# Patient Record
Sex: FEMALE | Race: WHITE | Employment: FULL TIME | URBAN - METROPOLITAN AREA
[De-identification: names, ages, dates, MRNs, and addresses within clinical notes are randomized per-mention and may not be internally consistent; named-entity substitution may affect disease eponyms.]

---

## 2017-07-07 ENCOUNTER — APPOINTMENT (OUTPATIENT)
Dept: OCCUPATIONAL MEDICINE | Age: 62
End: 2017-07-07
Attending: FAMILY MEDICINE

## 2020-07-06 ENCOUNTER — NURSE NAVIGATOR ENCOUNTER (OUTPATIENT)
Dept: HEMATOLOGY/ONCOLOGY | Facility: HOSPITAL | Age: 65
End: 2020-07-06

## 2020-07-10 ENCOUNTER — OFFICE VISIT (OUTPATIENT)
Dept: RADIATION ONCOLOGY | Facility: HOSPITAL | Age: 65
End: 2020-07-10
Attending: RADIOLOGY
Payer: COMMERCIAL

## 2020-07-10 VITALS
OXYGEN SATURATION: 98 % | WEIGHT: 152.38 LBS | TEMPERATURE: 98 F | SYSTOLIC BLOOD PRESSURE: 153 MMHG | DIASTOLIC BLOOD PRESSURE: 73 MMHG | HEART RATE: 75 BPM | RESPIRATION RATE: 16 BRPM

## 2020-07-10 DIAGNOSIS — C50.312 CARCINOMA OF LOWER-INNER QUADRANT OF LEFT BREAST IN FEMALE, ESTROGEN RECEPTOR POSITIVE (HCC): Primary | ICD-10-CM

## 2020-07-10 DIAGNOSIS — Z17.0 CARCINOMA OF LOWER-INNER QUADRANT OF LEFT BREAST IN FEMALE, ESTROGEN RECEPTOR POSITIVE (HCC): Primary | ICD-10-CM

## 2020-07-10 PROBLEM — C50.919 MALIGNANT NEOPLASM OF FEMALE BREAST (HCC): Status: ACTIVE | Noted: 2019-05-01

## 2020-07-10 PROCEDURE — 99202 OFFICE O/P NEW SF 15 MIN: CPT

## 2020-07-10 RX ORDER — HYDROCHLOROTHIAZIDE 12.5 MG/1
CAPSULE, GELATIN COATED ORAL
COMMUNITY
Start: 2014-06-26

## 2020-07-10 RX ORDER — ANASTROZOLE 1 MG/1
TABLET ORAL
COMMUNITY
Start: 2020-07-09 | End: 2020-07-10

## 2020-07-10 RX ORDER — ANASTROZOLE 1 MG/1
1 TABLET ORAL DAILY
COMMUNITY
End: 2022-01-03

## 2020-07-10 NOTE — CONSULTS
[facility] RADIATION ONCOLOGY CONSULTATION     PATIENT:   Liana June MD:  Carline Babb MD      DIAGNOSIS:   T1c N0 M0 ER positive ID positive HER-2/romario negative left lower inner breast IDC       CC:    Left breast cancer only 2, corresponding to a 3% risk of DM with endocrine therapy alone. She is now on anastrozole. No family history of breast or ovarian cancer but a positive history of colon cancer.   I believe her genetic counseling is being handled at [facility].    PMYVROSE/PS take 4 weeks  -We will need new baseline bilateral mammography in February 2021  -Endocrine therapy under care of Dr. Aashish Real MD  Radiation Oncology    CC: MD Andi Irizarry MD

## 2020-07-10 NOTE — PATIENT INSTRUCTIONS
CT Simulation will be 7/17/2020 at 1000am.  For any questions call [facility] or Ofe Minaya RN at [phone].

## 2020-07-10 NOTE — PROGRESS NOTES
Nursing Consultation Note  Patient: Everette Kelley  YOB: 1955  Age: 60year old  Radiation Oncologist: Dr. Tanja Donohue  Referring Physician: Erasmo Tesfaye  Diagnosis:No diagnosis found.   Consult Date: 7/10/2020      Chemotherapy: N on file      Food insecurity:        Worry: Not on file        Inability: Not on file      Transportation needs:        Medical: Not on file        Non-medical: Not on file    Tobacco Use      Smoking status: Never Smoker    Substance and Sexual Activity dry    Expected Outcomes:  Intact skin Free from infection Knowledge of disease process    Progress Toward Outcome:  Making progress    Pamphlets/Handouts Given to Patient:  Basic skin care.         Knowledge Deficit Plan Of Care:    Problem:  Knowledge Def

## 2020-07-17 ENCOUNTER — APPOINTMENT (OUTPATIENT)
Dept: RADIATION ONCOLOGY | Facility: HOSPITAL | Age: 65
End: 2020-07-17
Attending: RADIOLOGY
Payer: COMMERCIAL

## 2020-07-17 PROCEDURE — 77290 THER RAD SIMULAJ FIELD CPLX: CPT | Performed by: RADIOLOGY

## 2020-07-17 PROCEDURE — 77333 RADIATION TREATMENT AID(S): CPT | Performed by: RADIOLOGY

## 2020-07-22 PROCEDURE — 77334 RADIATION TREATMENT AID(S): CPT | Performed by: RADIOLOGY

## 2020-07-22 PROCEDURE — 77295 3-D RADIOTHERAPY PLAN: CPT | Performed by: RADIOLOGY

## 2020-07-22 PROCEDURE — 77300 RADIATION THERAPY DOSE PLAN: CPT | Performed by: RADIOLOGY

## 2020-07-23 PROCEDURE — 77290 THER RAD SIMULAJ FIELD CPLX: CPT | Performed by: RADIOLOGY

## 2020-07-23 PROCEDURE — 77300 RADIATION THERAPY DOSE PLAN: CPT | Performed by: RADIOLOGY

## 2020-07-23 PROCEDURE — 77295 3-D RADIOTHERAPY PLAN: CPT | Performed by: RADIOLOGY

## 2020-07-23 PROCEDURE — 77334 RADIATION TREATMENT AID(S): CPT | Performed by: RADIOLOGY

## 2020-07-27 ENCOUNTER — LAB ENCOUNTER (OUTPATIENT)
Dept: LAB | Facility: HOSPITAL | Age: 65
End: 2020-07-27
Attending: RADIOLOGY
Payer: COMMERCIAL

## 2020-07-27 DIAGNOSIS — C50.312 CARCINOMA OF LOWER-INNER QUADRANT OF LEFT BREAST IN FEMALE, ESTROGEN RECEPTOR POSITIVE (HCC): ICD-10-CM

## 2020-07-27 DIAGNOSIS — Z17.0 CARCINOMA OF LOWER-INNER QUADRANT OF LEFT BREAST IN FEMALE, ESTROGEN RECEPTOR POSITIVE (HCC): ICD-10-CM

## 2020-07-28 LAB — SARS-COV-2 RNA RESP QL NAA+PROBE: NOT DETECTED

## 2020-08-01 ENCOUNTER — NURSE ONLY (OUTPATIENT)
Dept: RADIATION ONCOLOGY | Facility: HOSPITAL | Age: 65
End: 2020-08-01
Attending: RADIOLOGY
Payer: COMMERCIAL

## 2020-08-06 ENCOUNTER — APPOINTMENT (OUTPATIENT)
Dept: RADIATION ONCOLOGY | Facility: HOSPITAL | Age: 65
End: 2020-08-06
Attending: RADIOLOGY
Payer: COMMERCIAL

## 2020-08-06 PROCEDURE — 77280 THER RAD SIMULAJ FIELD SMPL: CPT | Performed by: RADIOLOGY

## 2020-08-06 PROCEDURE — 77412 RADIATION TX DELIVERY LVL 3: CPT | Performed by: RADIOLOGY

## 2020-08-07 PROCEDURE — 77412 RADIATION TX DELIVERY LVL 3: CPT | Performed by: RADIOLOGY

## 2020-08-07 PROCEDURE — 77290 THER RAD SIMULAJ FIELD CPLX: CPT | Performed by: RADIOLOGY

## 2020-08-10 PROCEDURE — 77387 GUIDANCE FOR RADJ TX DLVR: CPT | Performed by: RADIOLOGY

## 2020-08-10 PROCEDURE — 77412 RADIATION TX DELIVERY LVL 3: CPT | Performed by: RADIOLOGY

## 2020-08-11 ENCOUNTER — OFFICE VISIT (OUTPATIENT)
Dept: RADIATION ONCOLOGY | Facility: HOSPITAL | Age: 65
End: 2020-08-11
Attending: RADIOLOGY
Payer: COMMERCIAL

## 2020-08-11 VITALS
OXYGEN SATURATION: 99 % | DIASTOLIC BLOOD PRESSURE: 79 MMHG | SYSTOLIC BLOOD PRESSURE: 135 MMHG | HEART RATE: 61 BPM | RESPIRATION RATE: 16 BRPM | TEMPERATURE: 98 F

## 2020-08-11 DIAGNOSIS — C50.312 CARCINOMA OF LOWER-INNER QUADRANT OF LEFT BREAST IN FEMALE, ESTROGEN RECEPTOR POSITIVE (HCC): Primary | ICD-10-CM

## 2020-08-11 DIAGNOSIS — Z17.0 CARCINOMA OF LOWER-INNER QUADRANT OF LEFT BREAST IN FEMALE, ESTROGEN RECEPTOR POSITIVE (HCC): Primary | ICD-10-CM

## 2020-08-11 PROCEDURE — 77412 RADIATION TX DELIVERY LVL 3: CPT | Performed by: RADIOLOGY

## 2020-08-11 PROCEDURE — 77387 GUIDANCE FOR RADJ TX DLVR: CPT | Performed by: RADIOLOGY

## 2020-08-11 NOTE — PROGRESS NOTES
[facility] Radiation Treatment Management Note 1-5    Patient:  Elizabeth Koch  Age:  60year old  Visit Diagnosis:    1.  Carcinoma of lower-inner quadrant of left breast in female, estrogen receptor positive ([facility] [address]      Primary

## 2020-08-12 PROCEDURE — 77412 RADIATION TX DELIVERY LVL 3: CPT | Performed by: RADIOLOGY

## 2020-08-12 PROCEDURE — 77387 GUIDANCE FOR RADJ TX DLVR: CPT | Performed by: RADIOLOGY

## 2020-08-13 PROCEDURE — 77387 GUIDANCE FOR RADJ TX DLVR: CPT | Performed by: RADIOLOGY

## 2020-08-13 PROCEDURE — 77412 RADIATION TX DELIVERY LVL 3: CPT | Performed by: RADIOLOGY

## 2020-08-14 PROCEDURE — 77387 GUIDANCE FOR RADJ TX DLVR: CPT | Performed by: RADIOLOGY

## 2020-08-14 PROCEDURE — 77412 RADIATION TX DELIVERY LVL 3: CPT | Performed by: RADIOLOGY

## 2020-08-14 PROCEDURE — 77336 RADIATION PHYSICS CONSULT: CPT | Performed by: RADIOLOGY

## 2020-08-17 PROCEDURE — 77387 GUIDANCE FOR RADJ TX DLVR: CPT | Performed by: RADIOLOGY

## 2020-08-17 PROCEDURE — 77412 RADIATION TX DELIVERY LVL 3: CPT | Performed by: RADIOLOGY

## 2020-08-18 ENCOUNTER — APPOINTMENT (OUTPATIENT)
Dept: RADIATION ONCOLOGY | Facility: HOSPITAL | Age: 65
End: 2020-08-18
Attending: RADIOLOGY
Payer: COMMERCIAL

## 2020-08-18 PROCEDURE — 77387 GUIDANCE FOR RADJ TX DLVR: CPT | Performed by: RADIOLOGY

## 2020-08-18 PROCEDURE — 77412 RADIATION TX DELIVERY LVL 3: CPT | Performed by: RADIOLOGY

## 2020-08-19 PROCEDURE — 77412 RADIATION TX DELIVERY LVL 3: CPT | Performed by: RADIOLOGY

## 2020-08-19 PROCEDURE — 77387 GUIDANCE FOR RADJ TX DLVR: CPT | Performed by: RADIOLOGY

## 2020-08-20 ENCOUNTER — OFFICE VISIT (OUTPATIENT)
Dept: RADIATION ONCOLOGY | Facility: HOSPITAL | Age: 65
End: 2020-08-20
Attending: RADIOLOGY
Payer: COMMERCIAL

## 2020-08-20 VITALS
TEMPERATURE: 99 F | RESPIRATION RATE: 16 BRPM | HEART RATE: 70 BPM | DIASTOLIC BLOOD PRESSURE: 73 MMHG | SYSTOLIC BLOOD PRESSURE: 143 MMHG

## 2020-08-20 DIAGNOSIS — Z17.0 CARCINOMA OF LOWER-INNER QUADRANT OF LEFT BREAST IN FEMALE, ESTROGEN RECEPTOR POSITIVE (HCC): Primary | ICD-10-CM

## 2020-08-20 DIAGNOSIS — C50.312 CARCINOMA OF LOWER-INNER QUADRANT OF LEFT BREAST IN FEMALE, ESTROGEN RECEPTOR POSITIVE (HCC): Primary | ICD-10-CM

## 2020-08-20 PROCEDURE — 77412 RADIATION TX DELIVERY LVL 3: CPT | Performed by: RADIOLOGY

## 2020-08-20 PROCEDURE — 77387 GUIDANCE FOR RADJ TX DLVR: CPT | Performed by: RADIOLOGY

## 2020-08-20 NOTE — PROGRESS NOTES
[facility] Radiation Treatment Management Note 11-15    Patient:  Lorenzo Rao  Age:  60year old  Visit Diagnosis:  L breast IDC, pT1c N0 cM0, stage IA, grade 1, ER/AR+  Primary Rad/Onc:  Dr. Rere Thomas

## 2020-08-21 ENCOUNTER — APPOINTMENT (OUTPATIENT)
Dept: RADIATION ONCOLOGY | Facility: HOSPITAL | Age: 65
End: 2020-08-21
Attending: RADIOLOGY
Payer: COMMERCIAL

## 2020-08-21 PROCEDURE — 77290 THER RAD SIMULAJ FIELD CPLX: CPT | Performed by: RADIOLOGY

## 2020-08-21 PROCEDURE — 77412 RADIATION TX DELIVERY LVL 3: CPT | Performed by: RADIOLOGY

## 2020-08-21 PROCEDURE — 77336 RADIATION PHYSICS CONSULT: CPT | Performed by: RADIOLOGY

## 2020-08-21 PROCEDURE — 77285 THER RAD SIMULAJ FIELD INTRM: CPT | Performed by: RADIOLOGY

## 2020-08-24 PROCEDURE — 77412 RADIATION TX DELIVERY LVL 3: CPT | Performed by: RADIOLOGY

## 2020-08-24 PROCEDURE — 77387 GUIDANCE FOR RADJ TX DLVR: CPT | Performed by: RADIOLOGY

## 2020-08-25 PROCEDURE — 77321 SPECIAL TELETX PORT PLAN: CPT | Performed by: RADIOLOGY

## 2020-08-25 PROCEDURE — 77290 THER RAD SIMULAJ FIELD CPLX: CPT | Performed by: RADIOLOGY

## 2020-08-25 PROCEDURE — 77334 RADIATION TREATMENT AID(S): CPT | Performed by: RADIOLOGY

## 2020-08-25 PROCEDURE — 77412 RADIATION TX DELIVERY LVL 3: CPT | Performed by: RADIOLOGY

## 2020-08-26 PROCEDURE — 77387 GUIDANCE FOR RADJ TX DLVR: CPT | Performed by: RADIOLOGY

## 2020-08-26 PROCEDURE — 77412 RADIATION TX DELIVERY LVL 3: CPT | Performed by: RADIOLOGY

## 2020-08-27 ENCOUNTER — APPOINTMENT (OUTPATIENT)
Dept: RADIATION ONCOLOGY | Facility: HOSPITAL | Age: 65
End: 2020-08-27
Attending: RADIOLOGY
Payer: COMMERCIAL

## 2020-08-27 VITALS
DIASTOLIC BLOOD PRESSURE: 83 MMHG | TEMPERATURE: 99 F | SYSTOLIC BLOOD PRESSURE: 141 MMHG | HEART RATE: 62 BPM | OXYGEN SATURATION: 100 % | RESPIRATION RATE: 16 BRPM

## 2020-08-27 DIAGNOSIS — Z17.0 CARCINOMA OF LOWER-INNER QUADRANT OF LEFT BREAST IN FEMALE, ESTROGEN RECEPTOR POSITIVE (HCC): Primary | ICD-10-CM

## 2020-08-27 DIAGNOSIS — C50.312 CARCINOMA OF LOWER-INNER QUADRANT OF LEFT BREAST IN FEMALE, ESTROGEN RECEPTOR POSITIVE (HCC): Primary | ICD-10-CM

## 2020-08-27 PROCEDURE — 77412 RADIATION TX DELIVERY LVL 3: CPT | Performed by: RADIOLOGY

## 2020-08-27 PROCEDURE — 77280 THER RAD SIMULAJ FIELD SMPL: CPT | Performed by: RADIOLOGY

## 2020-08-27 NOTE — PROGRESS NOTES
[facility] Radiation Treatment Management Note 16-20    Patient:  Tonny Yu  Age:  60year old  Visit Diagnosis:  L breast IDC, pT1c N0 cM0, stage IA, grade 1, ER/AZ+  Primary Rad/Onc:  Dr. Francisca Stacy

## 2020-08-28 PROCEDURE — 77336 RADIATION PHYSICS CONSULT: CPT | Performed by: RADIOLOGY

## 2020-08-28 PROCEDURE — 77412 RADIATION TX DELIVERY LVL 3: CPT | Performed by: RADIOLOGY

## 2020-08-31 PROCEDURE — 77412 RADIATION TX DELIVERY LVL 3: CPT | Performed by: RADIOLOGY

## 2020-09-01 ENCOUNTER — APPOINTMENT (OUTPATIENT)
Dept: RADIATION ONCOLOGY | Facility: HOSPITAL | Age: 65
End: 2020-09-01
Attending: RADIOLOGY
Payer: COMMERCIAL

## 2020-09-01 PROCEDURE — 77412 RADIATION TX DELIVERY LVL 3: CPT | Performed by: RADIOLOGY

## 2020-09-02 ENCOUNTER — DOCUMENTATION ONLY (OUTPATIENT)
Dept: RADIATION ONCOLOGY | Facility: HOSPITAL | Age: 65
End: 2020-09-02

## 2020-09-02 ENCOUNTER — OFFICE VISIT (OUTPATIENT)
Dept: RADIATION ONCOLOGY | Facility: HOSPITAL | Age: 65
End: 2020-09-02
Attending: RADIOLOGY
Payer: COMMERCIAL

## 2020-09-02 VITALS
HEART RATE: 64 BPM | TEMPERATURE: 98 F | DIASTOLIC BLOOD PRESSURE: 80 MMHG | SYSTOLIC BLOOD PRESSURE: 133 MMHG | RESPIRATION RATE: 16 BRPM

## 2020-09-02 DIAGNOSIS — Z17.0 CARCINOMA OF LOWER-INNER QUADRANT OF LEFT BREAST IN FEMALE, ESTROGEN RECEPTOR POSITIVE (HCC): Primary | ICD-10-CM

## 2020-09-02 DIAGNOSIS — C50.312 CARCINOMA OF LOWER-INNER QUADRANT OF LEFT BREAST IN FEMALE, ESTROGEN RECEPTOR POSITIVE (HCC): Primary | ICD-10-CM

## 2020-09-02 PROCEDURE — 77412 RADIATION TX DELIVERY LVL 3: CPT | Performed by: RADIOLOGY

## 2020-09-02 PROCEDURE — 77336 RADIATION PHYSICS CONSULT: CPT | Performed by: RADIOLOGY

## 2020-09-02 NOTE — PROGRESS NOTES
[facility] Radiation Treatment Management Note 16-20    Patient:  Peg Record  Age:  60year old  Visit Diagnosis:  L breast IDC, pT1c N0 cM0, stage IA, grade 1, ER/SD+  Primary Rad/Onc:  Dr. Mil Adams

## 2020-09-02 NOTE — PROGRESS NOTES
[facility]     PATIENT:  Kimberly Brock MD: Edmundo Simms MD  DIAGNOSIS:  Left breast cancer    HISTORY   82-year-old woman with abnormal mammography hospital in the 7 o'clock position, left carmen

## 2020-09-02 NOTE — PATIENT INSTRUCTIONS
Continue to moisturize for the next 2-3 weeks. Aquaphor to the area underneath the breast.    Hydrocortisone for itching.      Please call with any questions or concerns to RN number [phone]    SPF 30 or greater when exposed to the sun, as your skin

## 2020-10-07 ENCOUNTER — APPOINTMENT (OUTPATIENT)
Dept: RADIATION ONCOLOGY | Facility: HOSPITAL | Age: 65
End: 2020-10-07
Attending: RADIOLOGY
Payer: COMMERCIAL

## 2020-10-21 ENCOUNTER — OFFICE VISIT (OUTPATIENT)
Dept: RADIATION ONCOLOGY | Facility: HOSPITAL | Age: 65
End: 2020-10-21
Attending: RADIOLOGY
Payer: COMMERCIAL

## 2020-10-21 VITALS
RESPIRATION RATE: 16 BRPM | DIASTOLIC BLOOD PRESSURE: 83 MMHG | SYSTOLIC BLOOD PRESSURE: 137 MMHG | HEART RATE: 71 BPM | TEMPERATURE: 98 F | OXYGEN SATURATION: 97 %

## 2020-10-21 DIAGNOSIS — C50.312 CARCINOMA OF LOWER-INNER QUADRANT OF LEFT BREAST IN FEMALE, ESTROGEN RECEPTOR POSITIVE (HCC): Primary | ICD-10-CM

## 2020-10-21 DIAGNOSIS — Z17.0 CARCINOMA OF LOWER-INNER QUADRANT OF LEFT BREAST IN FEMALE, ESTROGEN RECEPTOR POSITIVE (HCC): Primary | ICD-10-CM

## 2020-10-21 PROCEDURE — 99211 OFF/OP EST MAY X REQ PHY/QHP: CPT

## 2020-10-21 NOTE — PROGRESS NOTES
Pt came in for follow up post RT finish. Medical history reviewed. Medications reviewed. VSS. Steady gait noted. Denies any pain. Denies fatigue. Eating and drinking well. States her skin has healed well. States she is tolerating Anastrazole well.  Pt s

## 2020-10-21 NOTE — PATIENT INSTRUCTIONS
-Next mammogram in Feb 2021. Dr. Diana Vázquez will place the order to have it performed at [place]. She will reach out to radiology about collecting old images from [address] [place] [address].    -Call Dr. Ravi [facility] to schedule follow-up with him (in-person or virtual).  C

## 2020-11-03 NOTE — PROGRESS NOTES
[facility]  Radiation Oncology Follow-up    Patient Name: Anaya Zafar   MRN: [id number]  Referring Physician: Dalila Moncada MD    Diagnosis: left breast invasive ductal carcinoma, grade 2, ER+, HER2-, pT1c N0 (0/1) cM0, overall stage IA residual hyperpigmentation and edema, no masses, no nipple abnormalities. Left axilla without edema, masses, or LAD.        Assessment: 67yo F with screen detected ER+ stage IA IDC of the L breast s/p initial lumpectomy/SLNB, re-excision for +margin with fi

## 2020-11-06 ENCOUNTER — TELEPHONE (OUTPATIENT)
Dept: RADIATION ONCOLOGY | Facility: HOSPITAL | Age: 65
End: 2020-11-06

## 2020-12-01 ENCOUNTER — TELEPHONE (OUTPATIENT)
Dept: RADIATION ONCOLOGY | Facility: HOSPITAL | Age: 65
End: 2020-12-01

## 2021-03-12 ENCOUNTER — HOSPITAL ENCOUNTER (OUTPATIENT)
Dept: MAMMOGRAPHY | Facility: HOSPITAL | Age: 66
Discharge: HOME | End: 2021-03-12
Attending: INTERNAL MEDICINE
Payer: COMMERCIAL

## 2021-03-12 DIAGNOSIS — C50.312 CARCINOMA OF LOWER-INNER QUADRANT OF LEFT BREAST IN FEMALE, ESTROGEN RECEPTOR POSITIVE (HCC): ICD-10-CM

## 2021-03-12 DIAGNOSIS — Z17.0 CARCINOMA OF LOWER-INNER QUADRANT OF LEFT BREAST IN FEMALE, ESTROGEN RECEPTOR POSITIVE (HCC): ICD-10-CM

## 2021-03-12 PROCEDURE — 77066 DX MAMMO INCL CAD BI: CPT | Performed by: INTERNAL MEDICINE

## 2021-03-12 PROCEDURE — 77062 BREAST TOMOSYNTHESIS BI: CPT | Performed by: INTERNAL MEDICINE

## 2021-04-09 ENCOUNTER — APPOINTMENT (OUTPATIENT)
Dept: HEMATOLOGY/ONCOLOGY | Facility: HOSPITAL | Age: 66
End: 2021-04-09
Attending: INTERNAL MEDICINE
Payer: COMMERCIAL

## 2021-04-23 ENCOUNTER — APPOINTMENT (OUTPATIENT)
Dept: HEMATOLOGY/ONCOLOGY | Facility: HOSPITAL | Age: 66
End: 2021-04-23
Attending: INTERNAL MEDICINE
Payer: COMMERCIAL

## 2021-05-21 ENCOUNTER — APPOINTMENT (OUTPATIENT)
Dept: HEMATOLOGY/ONCOLOGY | Facility: HOSPITAL | Age: 66
End: 2021-05-21
Attending: INTERNAL MEDICINE
Payer: COMMERCIAL

## 2021-05-21 VITALS
SYSTOLIC BLOOD PRESSURE: 163 MMHG | HEART RATE: 67 BPM | WEIGHT: 152 LBS | OXYGEN SATURATION: 98 % | TEMPERATURE: 98 F | RESPIRATION RATE: 16 BRPM | DIASTOLIC BLOOD PRESSURE: 90 MMHG | BODY MASS INDEX: 26.93 KG/M2 | HEIGHT: 63 IN

## 2021-05-21 DIAGNOSIS — M85.80 OSTEOPENIA AFTER MENOPAUSE: ICD-10-CM

## 2021-05-21 DIAGNOSIS — Z79.811 ENCOUNTER FOR MONITORING AROMATASE INHIBITOR THERAPY: ICD-10-CM

## 2021-05-21 DIAGNOSIS — Z17.0 MALIGNANT NEOPLASM OF LOWER-INNER QUADRANT OF LEFT BREAST IN FEMALE, ESTROGEN RECEPTOR POSITIVE (HCC): Primary | ICD-10-CM

## 2021-05-21 DIAGNOSIS — Z78.0 OSTEOPENIA AFTER MENOPAUSE: ICD-10-CM

## 2021-05-21 DIAGNOSIS — Z51.81 ENCOUNTER FOR MONITORING AROMATASE INHIBITOR THERAPY: ICD-10-CM

## 2021-05-21 DIAGNOSIS — C50.312 MALIGNANT NEOPLASM OF LOWER-INNER QUADRANT OF LEFT BREAST IN FEMALE, ESTROGEN RECEPTOR POSITIVE (HCC): Primary | ICD-10-CM

## 2021-05-21 PROCEDURE — 99245 OFF/OP CONSLTJ NEW/EST HI 55: CPT | Performed by: INTERNAL MEDICINE

## 2021-05-21 NOTE — CONSULTS
HPI     Brittany Estrada is a 63year old female here at the request of Jeanette Ferraro MD for evaluation of Malignant neoplasm of lower-inner quadrant of left breast in female, estrogen receptor positive (hcc)  (primary encounter diagnosis)  Encount change, fatigue and unexpected weight change. Respiratory: Negative for cough and shortness of breath. Cardiovascular: Negative for chest pain. Gastrointestinal: Negative for abdominal pain. Endocrine: Positive for hot flashes.    Musculoskeletal: good bowel sounds. Extremities: No edema. Neurological: Grossly intact. Lymphatics: There is no palpable lymphadenopathy throughout in the cervical, supraclavicular, axillary, or inguinal regions.   Psych/Depression: nl        ASSESSMENT/PLAN:   Maligna and programs to support the above recommendations was provided to the patient. MDM moderate. No orders of the defined types were placed in this encounter.       Results From Past 48 Hours:  No results found for this or any previous visit (from the breast (6 mm). These results were provided to the patient by letter and in person.       Final assessment: Known biopsy-proven malignancy     BI-RADS: 6     Recommendation: A     A lay result letter will be mailed to the patient, in accordance with MQSA r -- Margin uninvolved by DCIS, closest clearance ~ 3 mm    -- Atrophic change, focal ectatic ducts, stromal fibrosis and cautery     artifact    -- See comment and staging summary below        BREAST CANCER STAGING SUMMARY            Specimen/Procedure Other findings. ..................... Eddye Cozier ~ 11 mm clearance from medial           margin (combines specimen B &           C)   Margins for In Situ cancer:. ............... Uninvolved    Distance from closest margin(s). .. Eddye Cozier Eddye Cozier Eddye Cozier ~ 3 mm    Specific margin(s). ....... Eddye Cozier DIAGNOSTIC BILATERAL CPT=77066), 12/04/2014, 2:40 PM.  External Exams, [facility] SURGICAL SPECIMEN LEFT EM (CPT=76098), 3/12/2020, 12:42 PM.  External   Exams, [facility] DIAGNOSTIC  LEFT  (CPT=77065), 3/26/2020, 11:26 AM.  External Exams, [facility] LOCALIZATION WIRE 1 SITE EXCLUDE THE POSSIBILITY OF BREAST CANCER. A CLINICALLY SUSPICIOUS PALPABLE LUMP SHOULD BE BIOPSIED. For patients over the age of 36, the target due date for the patient's next mammogram has been entered into a reminder system.        Patient received

## 2021-09-01 ENCOUNTER — PATIENT MESSAGE (OUTPATIENT)
Dept: HEMATOLOGY/ONCOLOGY | Facility: HOSPITAL | Age: 66
End: 2021-09-01

## 2021-09-01 NOTE — TELEPHONE ENCOUNTER
From: Peg Record  To: Justino Almeida MD  Sent: 9/1/2021 10:18 AM CDT  Subject: Visit Follow-up Question    Hi Dr. Radha Schreiber,    I'm looking at [facility] and noticed I was supposed to see Dr. Chapis Santiago after my mammo in March. I never did that. ..woops!  I saw

## 2021-09-17 ENCOUNTER — HOSPITAL ENCOUNTER (OUTPATIENT)
Dept: MAMMOGRAPHY | Facility: HOSPITAL | Age: 66
Discharge: HOME | End: 2021-09-17
Attending: INTERNAL MEDICINE
Payer: COMMERCIAL

## 2021-09-17 ENCOUNTER — HOSPITAL ENCOUNTER (OUTPATIENT)
Dept: BONE DENSITY | Facility: HOSPITAL | Age: 66
Discharge: HOME | End: 2021-09-17
Attending: INTERNAL MEDICINE
Payer: COMMERCIAL

## 2021-09-17 DIAGNOSIS — Z17.0 MALIGNANT NEOPLASM OF LOWER-INNER QUADRANT OF LEFT BREAST IN FEMALE, ESTROGEN RECEPTOR POSITIVE (HCC): ICD-10-CM

## 2021-09-17 DIAGNOSIS — M85.80 OSTEOPENIA AFTER MENOPAUSE: ICD-10-CM

## 2021-09-17 DIAGNOSIS — C50.312 MALIGNANT NEOPLASM OF LOWER-INNER QUADRANT OF LEFT BREAST IN FEMALE, ESTROGEN RECEPTOR POSITIVE (HCC): ICD-10-CM

## 2021-09-17 DIAGNOSIS — Z78.0 OSTEOPENIA AFTER MENOPAUSE: ICD-10-CM

## 2021-09-17 PROCEDURE — 77061 BREAST TOMOSYNTHESIS UNI: CPT | Performed by: INTERNAL MEDICINE

## 2021-09-17 PROCEDURE — 77065 DX MAMMO INCL CAD UNI: CPT | Performed by: INTERNAL MEDICINE

## 2021-09-17 PROCEDURE — 77080 DXA BONE DENSITY AXIAL: CPT | Performed by: INTERNAL MEDICINE

## 2021-09-20 DIAGNOSIS — Z78.0 OSTEOPENIA AFTER MENOPAUSE: Primary | ICD-10-CM

## 2021-09-20 DIAGNOSIS — M85.80 OSTEOPENIA AFTER MENOPAUSE: Primary | ICD-10-CM

## 2021-09-23 ENCOUNTER — APPOINTMENT (OUTPATIENT)
Dept: HEMATOLOGY/ONCOLOGY | Facility: HOSPITAL | Age: 66
End: 2021-09-23
Attending: INTERNAL MEDICINE
Payer: COMMERCIAL

## 2021-10-04 ENCOUNTER — APPOINTMENT (OUTPATIENT)
Dept: RADIATION ONCOLOGY | Facility: HOSPITAL | Age: 66
End: 2021-10-04
Attending: INTERNAL MEDICINE
Payer: COMMERCIAL

## 2021-10-06 ENCOUNTER — OFFICE VISIT (OUTPATIENT)
Dept: RADIATION ONCOLOGY | Facility: HOSPITAL | Age: 66
End: 2021-10-06
Attending: INTERNAL MEDICINE
Payer: COMMERCIAL

## 2021-10-06 VITALS
OXYGEN SATURATION: 98 % | SYSTOLIC BLOOD PRESSURE: 148 MMHG | HEART RATE: 60 BPM | RESPIRATION RATE: 16 BRPM | TEMPERATURE: 99 F | DIASTOLIC BLOOD PRESSURE: 83 MMHG

## 2021-10-06 DIAGNOSIS — C50.312 CARCINOMA OF LOWER-INNER QUADRANT OF LEFT BREAST IN FEMALE, ESTROGEN RECEPTOR POSITIVE (HCC): Primary | ICD-10-CM

## 2021-10-06 DIAGNOSIS — Z17.0 CARCINOMA OF LOWER-INNER QUADRANT OF LEFT BREAST IN FEMALE, ESTROGEN RECEPTOR POSITIVE (HCC): Primary | ICD-10-CM

## 2021-10-06 PROCEDURE — 99211 OFF/OP EST MAY X REQ PHY/QHP: CPT

## 2021-10-06 NOTE — PROGRESS NOTES
[facility]  Radiation Oncology Follow-up    Patient Name: Peg Record   MRN: [id number]  Referring Physician: Carline Babb MD; Justino Almeida MD    Diagnosis: left breast invasive ductal carcinoma, grade 2, ER+, HER2-, pT1c N0 (0/1) cM diagnostic mammogram  -stable post-lumpectomy changes in L breast, 6 month bl, BIRADS 2    Assessment: 61yo F with screen detected ER+ stage IA IDC of the L breast s/p initial lumpectomy/SLNB, re-excision for +margin with final negative margins, and adjuva

## 2021-10-06 NOTE — PROGRESS NOTES
Nursing Follow-Up Note    Patient: Elizabeth Perla  YOB: 1955  Age: 63year old  Radiation Oncologist: Dr. Becca Aguilera  Referring Physician: Rajwinder Rossi  Chief Complaint: Patient presents with:  Breast Cancer    Date: 10/6

## 2021-10-06 NOTE — PATIENT INSTRUCTIONS
Call [phone] for radiation questions or concerns. Follow up with Dr. Ena Jean-Baptiste in 1 year.

## 2022-01-03 ENCOUNTER — OFFICE VISIT (OUTPATIENT)
Dept: HEMATOLOGY/ONCOLOGY | Facility: HOSPITAL | Age: 67
End: 2022-01-03
Attending: INTERNAL MEDICINE
Payer: COMMERCIAL

## 2022-01-03 VITALS
HEART RATE: 59 BPM | BODY MASS INDEX: 28.17 KG/M2 | WEIGHT: 159 LBS | HEIGHT: 63 IN | SYSTOLIC BLOOD PRESSURE: 153 MMHG | DIASTOLIC BLOOD PRESSURE: 79 MMHG | TEMPERATURE: 98 F | OXYGEN SATURATION: 97 % | RESPIRATION RATE: 16 BRPM

## 2022-01-03 DIAGNOSIS — Z79.811 ENCOUNTER FOR MONITORING AROMATASE INHIBITOR THERAPY: ICD-10-CM

## 2022-01-03 DIAGNOSIS — C50.312 MALIGNANT NEOPLASM OF LOWER-INNER QUADRANT OF LEFT BREAST IN FEMALE, ESTROGEN RECEPTOR POSITIVE (HCC): Primary | ICD-10-CM

## 2022-01-03 DIAGNOSIS — Z17.0 MALIGNANT NEOPLASM OF LOWER-INNER QUADRANT OF LEFT BREAST IN FEMALE, ESTROGEN RECEPTOR POSITIVE (HCC): Primary | ICD-10-CM

## 2022-01-03 DIAGNOSIS — Z51.81 ENCOUNTER FOR MONITORING AROMATASE INHIBITOR THERAPY: ICD-10-CM

## 2022-01-03 DIAGNOSIS — Z78.0 OSTEOPENIA AFTER MENOPAUSE: ICD-10-CM

## 2022-01-03 DIAGNOSIS — M85.80 OSTEOPENIA AFTER MENOPAUSE: ICD-10-CM

## 2022-01-03 LAB — VIT D+METAB SERPL-MCNC: 28.4 NG/ML (ref 30–100)

## 2022-01-03 PROCEDURE — 82306 VITAMIN D 25 HYDROXY: CPT | Performed by: INTERNAL MEDICINE

## 2022-01-03 PROCEDURE — 99214 OFFICE O/P EST MOD 30 MIN: CPT | Performed by: INTERNAL MEDICINE

## 2022-01-03 RX ORDER — ANASTROZOLE 1 MG/1
1 TABLET ORAL DAILY
Qty: 90 TABLET | Refills: 3 | Status: SHIPPED | OUTPATIENT
Start: 2022-01-03

## 2022-01-03 RX ORDER — ERGOCALCIFEROL 1.25 MG/1
50000 CAPSULE ORAL WEEKLY
Qty: 4 CAPSULE | Refills: 2 | Status: SHIPPED | OUTPATIENT
Start: 2022-01-03

## 2022-01-03 NOTE — PROGRESS NOTES
MERLINE     Darcy Garcia is a 61year old female here for follow up of Malignant neoplasm of lower-inner quadrant of left breast in female, estrogen receptor positive (hcc)  (primary encounter diagnosis)  Encounter for monitoring aromatase inhibitor t ca   • Cancer Paternal Grandfather         Colon Ca   • Breast Cancer Self          PHYSICAL EXAM:    /79 (BP Location: Left arm, Patient Position: Sitting, Cuff Size: adult)   Pulse 59   Temp 98.2 °F (36.8 °C) (Oral)   Resp 16   Ht 1.6 m (5' 3\") mammogram in September 2021 which was benign. She is due for bilateral breast mammogram in March 2022. On surveillance as per NCCN guidelines.   D/w patient f/u every 3-6 mo for 5 yrs, SBE monthly, Mammogram of post-lumpectomy every 6 mo until stable an with mean peak bone mass and are given in standard deviation (s.d.). Each 1 s.d. corresponds to approximately 10% below peak normal bone density.        [employer]IZATION CRITERIA   NORMAL T SCORE: Above -1 s.d.    OSTEOPENIA T SCORE: Between -1 an External Exams, [facility] CORE BX SPECIMEN, 6/18/2020, 11:51 AM.  [facility], [facility] LEXII 2D+3D DIAGNOSTIC [facility] BILAT   (EAL=80781/[id number]), 3/12/2021, 7:51 AM.      INDICATIONS: Malignant neoplasm of lower-inner quadrant of left breast in fem Finalized by (CST): Shaunna Lockhart MD on 9/17/2021 at 2:29 PM

## 2022-03-07 RX ORDER — ERGOCALCIFEROL 1.25 MG/1
CAPSULE ORAL
Qty: 4 CAPSULE | Refills: 12 | OUTPATIENT
Start: 2022-03-07

## 2022-03-23 ENCOUNTER — TELEPHONE (OUTPATIENT)
Dept: HEMATOLOGY/ONCOLOGY | Facility: HOSPITAL | Age: 67
End: 2022-03-23

## 2022-04-08 ENCOUNTER — HOSPITAL ENCOUNTER (OUTPATIENT)
Dept: MAMMOGRAPHY | Facility: HOSPITAL | Age: 67
Discharge: HOME | End: 2022-04-08
Attending: INTERNAL MEDICINE
Payer: COMMERCIAL

## 2022-04-08 DIAGNOSIS — C50.312 MALIGNANT NEOPLASM OF LOWER-INNER QUADRANT OF LEFT BREAST IN FEMALE, ESTROGEN RECEPTOR POSITIVE (HCC): ICD-10-CM

## 2022-04-08 DIAGNOSIS — Z17.0 MALIGNANT NEOPLASM OF LOWER-INNER QUADRANT OF LEFT BREAST IN FEMALE, ESTROGEN RECEPTOR POSITIVE (HCC): ICD-10-CM

## 2022-04-08 PROCEDURE — 77066 DX MAMMO INCL CAD BI: CPT | Performed by: INTERNAL MEDICINE

## 2022-04-08 PROCEDURE — 77062 BREAST TOMOSYNTHESIS BI: CPT | Performed by: INTERNAL MEDICINE

## 2022-07-25 ENCOUNTER — APPOINTMENT (OUTPATIENT)
Dept: HEMATOLOGY/ONCOLOGY | Facility: HOSPITAL | Age: 67
End: 2022-07-25
Attending: INTERNAL MEDICINE
Payer: COMMERCIAL

## 2022-08-12 ENCOUNTER — APPOINTMENT (OUTPATIENT)
Dept: HEMATOLOGY/ONCOLOGY | Facility: HOSPITAL | Age: 67
End: 2022-08-12
Attending: INTERNAL MEDICINE
Payer: COMMERCIAL

## 2022-08-26 ENCOUNTER — OFFICE VISIT (OUTPATIENT)
Dept: HEMATOLOGY/ONCOLOGY | Facility: HOSPITAL | Age: 67
End: 2022-08-26
Attending: NURSE PRACTITIONER
Payer: COMMERCIAL

## 2022-08-26 VITALS
DIASTOLIC BLOOD PRESSURE: 68 MMHG | RESPIRATION RATE: 16 BRPM | HEIGHT: 63 IN | TEMPERATURE: 98 F | HEART RATE: 64 BPM | SYSTOLIC BLOOD PRESSURE: 148 MMHG | OXYGEN SATURATION: 97 % | BODY MASS INDEX: 28 KG/M2 | WEIGHT: 158 LBS

## 2022-08-26 DIAGNOSIS — Z17.0 MALIGNANT NEOPLASM OF LOWER-INNER QUADRANT OF LEFT BREAST IN FEMALE, ESTROGEN RECEPTOR POSITIVE (HCC): Primary | ICD-10-CM

## 2022-08-26 DIAGNOSIS — M79.10 MYALGIA: ICD-10-CM

## 2022-08-26 DIAGNOSIS — C50.312 MALIGNANT NEOPLASM OF LOWER-INNER QUADRANT OF LEFT BREAST IN FEMALE, ESTROGEN RECEPTOR POSITIVE (HCC): Primary | ICD-10-CM

## 2022-08-26 DIAGNOSIS — Z51.81 ENCOUNTER FOR MONITORING AROMATASE INHIBITOR THERAPY: ICD-10-CM

## 2022-08-26 DIAGNOSIS — Z79.811 ENCOUNTER FOR MONITORING AROMATASE INHIBITOR THERAPY: ICD-10-CM

## 2022-08-26 DIAGNOSIS — E55.9 VITAMIN D INSUFFICIENCY: ICD-10-CM

## 2022-08-26 PROCEDURE — 99214 OFFICE O/P EST MOD 30 MIN: CPT | Performed by: PHYSICIAN ASSISTANT

## 2022-08-26 NOTE — PATIENT INSTRUCTIONS
1.  Hold anastrozole x 1 month. Follow-up within the month of muscle pain improved. Can switch to Letrozole    2. Vitamin D level since low levels can cause muscle issues    3. Mammogram due in April 2022. DEXA not due until September 2023. Continue calcium/vitamin D     4. Call as needed    5.   Follow-up in 6 months - unless above symptoms are not controlled

## 2022-12-28 ENCOUNTER — TELEPHONE (OUTPATIENT)
Dept: RADIATION ONCOLOGY | Facility: HOSPITAL | Age: 67
End: 2022-12-28

## 2022-12-28 ENCOUNTER — TELEPHONE (OUTPATIENT)
Dept: HEMATOLOGY/ONCOLOGY | Facility: HOSPITAL | Age: 67
End: 2022-12-28

## 2022-12-28 NOTE — TELEPHONE ENCOUNTER
Returned phone. Patient asking if needs to see Dr. Noelle Sherman for follow up tomorrow. Patient states \" I thought I did not need to see her anymore. \" Patient has 1 year follow up appointment scheduled tomorrow at 2:30 pm. Per Dr. Noelle Sherman last note:RTC in 1 year or sooner if needed. Patient informed will forward message to RT RN's to clarify.

## 2022-12-28 NOTE — TELEPHONE ENCOUNTER
Patient has an appt tomorrow with Griselda Mckeon was under the assumption that she didn't need appt per  no longer needed to follow up with a radiation oncologist. Please call to confirm.

## 2022-12-28 NOTE — TELEPHONE ENCOUNTER
Patient called to cancel tomorrows appt 12/29/22  Per patient doesn't need to reschedule. Is under the assumption that  said she didn't need appt anymore.

## 2022-12-29 ENCOUNTER — TELEPHONE (OUTPATIENT)
Dept: RADIATION ONCOLOGY | Facility: HOSPITAL | Age: 67
End: 2022-12-29

## 2022-12-29 ENCOUNTER — TELEPHONE (OUTPATIENT)
Dept: HEMATOLOGY/ONCOLOGY | Facility: HOSPITAL | Age: 67
End: 2022-12-29

## 2022-12-29 ENCOUNTER — APPOINTMENT (OUTPATIENT)
Dept: RADIATION ONCOLOGY | Facility: HOSPITAL | Age: 67
End: 2022-12-29
Attending: INTERNAL MEDICINE
Payer: COMMERCIAL

## 2022-12-29 DIAGNOSIS — Z17.0 MALIGNANT NEOPLASM OF LOWER-INNER QUADRANT OF LEFT BREAST IN FEMALE, ESTROGEN RECEPTOR POSITIVE (HCC): Primary | ICD-10-CM

## 2022-12-29 DIAGNOSIS — C50.312 MALIGNANT NEOPLASM OF LOWER-INNER QUADRANT OF LEFT BREAST IN FEMALE, ESTROGEN RECEPTOR POSITIVE (HCC): Primary | ICD-10-CM

## 2022-12-29 NOTE — TELEPHONE ENCOUNTER
Patient cancelled RT appointment via [employer]. Patient called and left message: RT guidelines are follow up appointments yearly for 3 years. Patient informed follow up with Dr. Alexey Grijalva every 6 months (on anastrozole)  and was due for 6 month Mammogram of left breast in October. Provided patient with [facility] phone number and to please call to schedule follow up appointments.

## 2022-12-29 NOTE — TELEPHONE ENCOUNTER
Patient is calling because she was trying to schedule her mammogram and the order she has is due to  on 1-3-23 and she is wondering if another can be put in for her.  Call back number is [phone]

## 2023-01-26 ENCOUNTER — HOSPITAL ENCOUNTER (OUTPATIENT)
Dept: MAMMOGRAPHY | Facility: HOSPITAL | Age: 68
Discharge: HOME | End: 2023-01-26
Attending: INTERNAL MEDICINE
Payer: COMMERCIAL

## 2023-01-26 DIAGNOSIS — Z17.0 MALIGNANT NEOPLASM OF LOWER-INNER QUADRANT OF LEFT BREAST IN FEMALE, ESTROGEN RECEPTOR POSITIVE (HCC): ICD-10-CM

## 2023-01-26 DIAGNOSIS — C50.312 MALIGNANT NEOPLASM OF LOWER-INNER QUADRANT OF LEFT BREAST IN FEMALE, ESTROGEN RECEPTOR POSITIVE (HCC): ICD-10-CM

## 2023-01-26 PROCEDURE — 77065 DX MAMMO INCL CAD UNI: CPT | Performed by: INTERNAL MEDICINE

## 2023-01-26 PROCEDURE — 77061 BREAST TOMOSYNTHESIS UNI: CPT | Performed by: INTERNAL MEDICINE

## 2023-02-02 ENCOUNTER — APPOINTMENT (OUTPATIENT)
Dept: HEMATOLOGY/ONCOLOGY | Facility: HOSPITAL | Age: 68
End: 2023-02-02
Attending: INTERNAL MEDICINE
Payer: COMMERCIAL

## 2023-05-04 ENCOUNTER — APPOINTMENT (OUTPATIENT)
Dept: HEMATOLOGY/ONCOLOGY | Facility: HOSPITAL | Age: 68
End: 2023-05-04
Attending: INTERNAL MEDICINE
Payer: COMMERCIAL

## 2023-05-18 ENCOUNTER — OFFICE VISIT (OUTPATIENT)
Dept: HEMATOLOGY/ONCOLOGY | Facility: HOSPITAL | Age: 68
End: 2023-05-18
Attending: INTERNAL MEDICINE
Payer: COMMERCIAL

## 2023-05-18 VITALS
BODY MASS INDEX: 26.93 KG/M2 | DIASTOLIC BLOOD PRESSURE: 87 MMHG | HEIGHT: 63 IN | HEART RATE: 67 BPM | TEMPERATURE: 99 F | WEIGHT: 152 LBS | OXYGEN SATURATION: 98 % | SYSTOLIC BLOOD PRESSURE: 167 MMHG | RESPIRATION RATE: 16 BRPM

## 2023-05-18 DIAGNOSIS — Z78.0 OSTEOPENIA AFTER MENOPAUSE: ICD-10-CM

## 2023-05-18 DIAGNOSIS — Z08 ENCOUNTER FOR FOLLOW-UP SURVEILLANCE OF BREAST CANCER: ICD-10-CM

## 2023-05-18 DIAGNOSIS — Z51.81 ENCOUNTER FOR MONITORING AROMATASE INHIBITOR THERAPY: ICD-10-CM

## 2023-05-18 DIAGNOSIS — M85.80 OSTEOPENIA AFTER MENOPAUSE: ICD-10-CM

## 2023-05-18 DIAGNOSIS — Z85.3 ENCOUNTER FOR FOLLOW-UP SURVEILLANCE OF BREAST CANCER: ICD-10-CM

## 2023-05-18 DIAGNOSIS — Z79.811 ENCOUNTER FOR MONITORING AROMATASE INHIBITOR THERAPY: ICD-10-CM

## 2023-05-18 DIAGNOSIS — Z17.0 MALIGNANT NEOPLASM OF LOWER-INNER QUADRANT OF LEFT BREAST IN FEMALE, ESTROGEN RECEPTOR POSITIVE (HCC): Primary | ICD-10-CM

## 2023-05-18 DIAGNOSIS — C50.312 MALIGNANT NEOPLASM OF LOWER-INNER QUADRANT OF LEFT BREAST IN FEMALE, ESTROGEN RECEPTOR POSITIVE (HCC): Primary | ICD-10-CM

## 2023-05-18 PROCEDURE — 99214 OFFICE O/P EST MOD 30 MIN: CPT | Performed by: INTERNAL MEDICINE

## 2023-05-18 RX ORDER — LETROZOLE 2.5 MG/1
2.5 TABLET, FILM COATED ORAL DAILY
Qty: 90 TABLET | Refills: 3 | Status: SHIPPED | OUTPATIENT
Start: 2023-05-18

## 2023-11-16 ENCOUNTER — APPOINTMENT (OUTPATIENT)
Dept: HEMATOLOGY/ONCOLOGY | Facility: HOSPITAL | Age: 68
End: 2023-11-16
Attending: INTERNAL MEDICINE
Payer: COMMERCIAL

## 2024-01-25 ENCOUNTER — HOSPITAL ENCOUNTER (OUTPATIENT)
Dept: MAMMOGRAPHY | Facility: HOSPITAL | Age: 69
Discharge: HOME | End: 2024-01-25
Attending: INTERNAL MEDICINE
Payer: COMMERCIAL

## 2024-01-25 ENCOUNTER — APPOINTMENT (OUTPATIENT)
Dept: HEMATOLOGY/ONCOLOGY | Facility: HOSPITAL | Age: 69
End: 2024-01-25
Attending: INTERNAL MEDICINE
Payer: COMMERCIAL

## 2024-01-25 DIAGNOSIS — Z17.0 MALIGNANT NEOPLASM OF LOWER-INNER QUADRANT OF LEFT BREAST IN FEMALE, ESTROGEN RECEPTOR POSITIVE  (HCC): ICD-10-CM

## 2024-01-25 DIAGNOSIS — C50.312 MALIGNANT NEOPLASM OF LOWER-INNER QUADRANT OF LEFT BREAST IN FEMALE, ESTROGEN RECEPTOR POSITIVE  (HCC): ICD-10-CM

## 2024-01-25 PROCEDURE — 77062 BREAST TOMOSYNTHESIS BI: CPT | Performed by: INTERNAL MEDICINE

## 2024-01-25 PROCEDURE — 77066 DX MAMMO INCL CAD BI: CPT | Performed by: INTERNAL MEDICINE

## 2024-02-02 ENCOUNTER — OFFICE VISIT (OUTPATIENT)
Dept: HEMATOLOGY/ONCOLOGY | Facility: HOSPITAL | Age: 69
End: 2024-02-02
Attending: INTERNAL MEDICINE
Payer: COMMERCIAL

## 2024-02-02 VITALS
SYSTOLIC BLOOD PRESSURE: 162 MMHG | BODY MASS INDEX: 27.64 KG/M2 | RESPIRATION RATE: 16 BRPM | TEMPERATURE: 98 F | WEIGHT: 156 LBS | OXYGEN SATURATION: 100 % | HEART RATE: 59 BPM | DIASTOLIC BLOOD PRESSURE: 68 MMHG | HEIGHT: 63 IN

## 2024-02-02 DIAGNOSIS — Z08 ENCOUNTER FOR FOLLOW-UP SURVEILLANCE OF BREAST CANCER: ICD-10-CM

## 2024-02-02 DIAGNOSIS — M85.80 OSTEOPENIA AFTER MENOPAUSE: ICD-10-CM

## 2024-02-02 DIAGNOSIS — Z78.0 OSTEOPENIA AFTER MENOPAUSE: ICD-10-CM

## 2024-02-02 DIAGNOSIS — Z12.31 SCREENING MAMMOGRAM, ENCOUNTER FOR: ICD-10-CM

## 2024-02-02 DIAGNOSIS — Z51.81 ENCOUNTER FOR MONITORING AROMATASE INHIBITOR THERAPY: ICD-10-CM

## 2024-02-02 DIAGNOSIS — T45.1X5A AROMATASE INHIBITOR-ASSOCIATED ARTHRALGIA: ICD-10-CM

## 2024-02-02 DIAGNOSIS — M25.50 AROMATASE INHIBITOR-ASSOCIATED ARTHRALGIA: ICD-10-CM

## 2024-02-02 DIAGNOSIS — Z17.0 MALIGNANT NEOPLASM OF LOWER-INNER QUADRANT OF LEFT BREAST IN FEMALE, ESTROGEN RECEPTOR POSITIVE  (HCC): Primary | ICD-10-CM

## 2024-02-02 DIAGNOSIS — Z85.3 ENCOUNTER FOR FOLLOW-UP SURVEILLANCE OF BREAST CANCER: ICD-10-CM

## 2024-02-02 DIAGNOSIS — C50.312 MALIGNANT NEOPLASM OF LOWER-INNER QUADRANT OF LEFT BREAST IN FEMALE, ESTROGEN RECEPTOR POSITIVE  (HCC): Primary | ICD-10-CM

## 2024-02-02 DIAGNOSIS — Z79.811 ENCOUNTER FOR MONITORING AROMATASE INHIBITOR THERAPY: ICD-10-CM

## 2024-02-02 PROCEDURE — 99214 OFFICE O/P EST MOD 30 MIN: CPT | Performed by: INTERNAL MEDICINE

## 2024-02-02 RX ORDER — ANASTROZOLE 1 MG/1
1 TABLET ORAL DAILY
Qty: 90 TABLET | Refills: 0 | Status: SHIPPED | OUTPATIENT
Start: 2024-02-02

## 2024-02-02 RX ORDER — ANASTROZOLE 1 MG/1
TABLET ORAL
COMMUNITY
End: 2024-02-02

## 2024-02-02 NOTE — PROGRESS NOTES
HPI     Romina Hsu is a 68 year old female here for follow up of   Encounter Diagnoses   Name Primary?    Malignant neoplasm of lower-inner quadrant of left breast in female, estrogen receptor positive  (HCC) Yes    Encounter for monitoring aromatase inhibitor therapy     Osteopenia after menopause     Encounter for follow-up surveillance of breast cancer     Screening mammogram, encounter for     Aromatase inhibitor-associated arthralgia        Compliance with SBE: Yes. Changes on SBE: No.     Compliance with anastrozole:  compliance all of the time    Side effects from anastrozole: Yes arthralgias/myalgias started, 2022.  Improves with exercise.  Yes hot flashes     No issues with lymphedema.    She reports fatigue.     She is very active - stretching, walking, tennis.    Working on weight loss.      ECOG PS: 0.        Review of Systems:   Review of Systems   Constitutional:  Negative for appetite change, fatigue and unexpected weight change.   Respiratory:  Negative for cough and shortness of breath.    Cardiovascular:  Negative for chest pain and leg swelling.   Gastrointestinal:  Negative for abdominal pain.   Endocrine: Positive for hot flashes (at night and not horrible. ).   Musculoskeletal:  Positive for arthralgias (L knee DJD.) and myalgias. Negative for back pain (recent injury playing tenis yesterday morning.).        No bone pain   Skin:         Increased superficial varicosities BLE   Neurological:  Negative for dizziness and headaches.   Hematological:  Negative for adenopathy. Does not bruise/bleed easily.   Psychiatric/Behavioral:  Positive for sleep disturbance (Chronic).              Current Outpatient Medications   Medication Sig Dispense Refill    anastrozole 1 MG Oral Tab tab       ergocalciferol 1.25 MG (51220 UT) Oral Cap Take 1 capsule (50,000 Units total) by mouth once a week. 4 capsule 2    hydrochlorothiazide 12.5 MG Oral Cap        Allergies:   No Known Allergies    Past  Medical History:   Diagnosis Date    Breast cancer (HCC)     5/2019    Hypertension      Past Surgical History:   Procedure Laterality Date    LUMPECTOMY LEFT Left     3/2020    LUMPECTOMY LEFT Left     6/2020    RADIATION LEFT Left     8/2020     Social History     Socioeconomic History    Marital status:    Tobacco Use    Smoking status: Never    Smokeless tobacco: Never   Substance and Sexual Activity    Alcohol use: Yes    Drug use: Never       Family History   Problem Relation Age of Onset    Cancer Father         Colon Cancer    Cancer Sister 38        Colon ca    Cancer Paternal Grandfather         Colon Ca    Breast Cancer Self 64    Ovarian Cancer Neg          PHYSICAL EXAM:    BP (!) 162/68 (BP Location: Left arm, Patient Position: Sitting, Cuff Size: adult)   Pulse 59   Temp 98.1 °F (36.7 °C) (Oral)   Resp 16   Ht 1.6 m (5' 3\")   Wt 70.8 kg (156 lb)   SpO2 100%   BMI 27.63 kg/m²   Wt Readings from Last 6 Encounters:   02/02/24 70.8 kg (156 lb)   05/18/23 68.9 kg (152 lb)   08/26/22 71.7 kg (158 lb)   01/03/22 72.1 kg (159 lb)   05/21/21 68.9 kg (152 lb)   07/10/20 69.1 kg (152 lb 6.4 oz)     Physical Exam  General: Patient is alert, not in acute distress.  HEENT: EOMs intact.  Neck: No JVD. No palpable lymphadenopathy. Neck is supple.  Chest: Clear to auscultation.  Breasts: R breast no masses, L breast with surgical scar at the LIQ and at axilla, no masses.  Heart: Regular rate and rhythm.   Abdomen: Soft, non tender with good bowel sounds.  Extremities: No edema, increased superficial varicosities - ivonne.  Neurological: Grossly intact.   Lymphatics: There is no palpable lymphadenopathy throughout in the cervical, supraclavicular, or axillary regions.  Psych/Depression: nl        ASSESSMENT/PLAN:     1. Malignant neoplasm of lower-inner quadrant of left breast in female, estrogen receptor positive  (HCC)    2. Encounter for monitoring aromatase inhibitor therapy    3. Osteopenia after  menopause    4. Encounter for follow-up surveillance of breast cancer    5. Screening mammogram, encounter for    6. Aromatase inhibitor-associated arthralgia         Cancer Staging   Malignant neoplasm of lower-inner quadrant of left breast in female, estrogen receptor positive  (HCC)  Staging form: Breast, AJCC 8th Edition  - Pathologic stage from 5/21/2021: Stage IA (pT1c, pN0(sn), cM0, G2, ER+, MT+, HER2-, Oncotype DX score: 4) - Signed by Miranda Butcher MD on 5/21/2021    Patient with completed primary local treatment of left breast cancer with lumpectomy followed by radiation therapy.    Her Oncotype DX recurrence score was 4.  She has been on anastrozole since September 2020.  Prior to that due to insurance delay from time of dx to surgery, she was on tamoxifen since May of 2019, which was neoadjuvant until Sept of 2020.      On surveillance as per NCCN guidelines.  D/w patient f/u every 3-6 mo for 5 yrs, SBE monthly, Mammogram of post-lumpectomy every 6 mo until stable and then B mammogram yearly.  Further imaging and labs not recommended unless new symptoms.     The patient completes 5 years of treatment on May of 2024, as she had neoadjuvant tamoxifen starting on May of 2019.    L breast mammogram BIRADS-2 in January of 2023.  Will have B mammogram in July of 2023 for better coordination of imaging.      September 2021 DEXA = osteopenia.  Continue calcium and vitamin D.  Repeat DEXA in September 2023    Myalgias and arthralgias:  Will discontinue anastrozole as above.    During prior visit, patient is advised to begin or continue progressive daily aerobic exercise program, follow a low fat, low cholesterol diet, attempt to loose weight, decrease or avoid alcohol intake, have regular screening exams as age/gender appropriate, reduce salt in the diet and cooking, reduce exposure to stress, improve dietary compliance and continue current medications.  Information for the [facility] and programs to  support the above recommendations was provided to the patient.     Call prn.  Follow-up in 12 months unless symptoms unresolved.     MDM moderate.      No orders of the defined types were placed in this encounter.      Results From Past 48 Hours:  No results found for this or any previous visit (from the past 48 hour(s)).      Imaging & Referrals:  None   No orders of the defined types were placed in this encounter.    PROCEDURE: HA[facility]EM LEXII 2D+3D DIAGNOSTIC [facility] BILAT (CPT=77066/32162)     COMPARISON: External Exams, [facility] SCREENING BILAT (CPT=77067), 10/14/2017, 10:59 AM.  External Exams, [facility] SCREENING BILAT (CPT=77067), 5/17/2019, 12:54 PM.  External Exams, [facility] DIAGNOSTIC ADDL VWS BILAT (96192), 5/24/2019, 9:44 AM.  External Exams, [facility]  DIAGNOSTIC ADDL VWS LEFT (13361), 5/28/2019, 9:36 AM.  External Exams, [facility] DIAGNOSTIC BILATERAL BBC=[id number]), 2/18/2020, 7:20 AM.  External Exams, [facility] DIAGNOSTIC  LEFT  (CPT=77065), 3/12/2020, 9:03 AM.  External Exams, H[facility] SURGICAL SPECIMEN LEFT EM  (CPT=76098), 3/12/2020, 12:42 PM.  External Exams, [facility] LOCALIZATION WIRE 1 SITE LEFT(CPT=19281), 6/18/2020, 9:47 AM.  External Exams, [facility] POST PROCEDURAL IMAGE LEFT (CPT=77065), 6/18/2020, 11:51 AM.  [facility], HA[facility]EM LEXII 2D+3D  DIAGNOSTIC [facility] BILAT (HIT=[id number]), 3/12/2021, 7:51 AM.  [facility], HAKEEM LEXII 2D+3D DIAGNOSTIC [facility] LEFT (CPT=77065/41350), 9/17/2021, 2:05 PM.  [facility], HA[facility]EM LEXII 2D+3D DIAGNOSTIC [facility] BILAT  (UHH=[id number]), 4/08/2022, 9:08 AM.  [facility], HAKEEM LEXII 2D+3D DIAGNOSTIC [facility] LEFT (CPT=77065/70339), 1/26/2023, 7:11 AM.     INDICATIONS: Malignant neoplasm of lower-inner quadrant of left breast in female, estrogen receptor positive  (HCC) C50.312 Malignant neoplasm of lower-inner quadrant*     TECHNIQUE: Digital diagnostic mammography was performed and images were reviewed with the [employer] THEO 1.5.1.5 CAD device.  3D  tomosynthesis was performed and reviewed.        BREAST COMPOSITION:   Category b-Scattered areas fibroglandular density.        FINDINGS: There are postsurgical changes in the lower left breast and the left axilla. The parenchyma pattern is stable with no new suspicious asymmetry, mass, architectural distortion, or microcalcifications identified in either breast.                    Impression   CONCLUSION:   NO MAMMOGRAPHIC EVIDENCE OF BREAST MALIGNANCY.  AS LONG AS CLINICAL EXAMINATION REMAINS BENIGN, ANNUAL SCREENING MAMMOGRAM IS RECOMMENDED IN 1 YEAR.        BI-RADS CATEGORY:    DIAGNOSTIC CATEGORY 2--BENIGN FINDING:       RECOMMENDATIONS:  ROUTINE MAMMOGRAM AND CLINICAL EVALUATION IN 12 MONTHS.               PLEASE NOTE: NORMAL MAMMOGRAM DOES NOT EXCLUDE THE POSSIBILITY OF BREAST CANCER.  A CLINICALLY SUSPICIOUS PALPABLE LUMP SHOULD BE BIOPSIED.       For patients over the age of 40, the target due date for the patient's next mammogram has been entered into a reminder system.       Patient received a discharge summary from the technologist after completion of exam.     Breast marker legend used on images    Triangle = Palpable lump  [employer] = Skin tag or mole  BB = Nipple  Linear delia = Scar  Square = Pain           Dictated by (CST): America Nunn MD on 1/25/2024 at 1:37 PM      Finalized by (CST): America Nunn MD on 1/25/2024 at 1:45 PM

## 2024-05-01 DIAGNOSIS — C50.312 MALIGNANT NEOPLASM OF LOWER-INNER QUADRANT OF LEFT BREAST IN FEMALE, ESTROGEN RECEPTOR POSITIVE (HCC): ICD-10-CM

## 2024-05-01 DIAGNOSIS — Z17.0 MALIGNANT NEOPLASM OF LOWER-INNER QUADRANT OF LEFT BREAST IN FEMALE, ESTROGEN RECEPTOR POSITIVE (HCC): ICD-10-CM

## 2024-05-02 RX ORDER — ANASTROZOLE 1 MG/1
TABLET ORAL
Qty: 90 TABLET | Refills: 3 | OUTPATIENT
Start: 2024-05-02

## 2025-01-16 DIAGNOSIS — Z12.31 SCREENING MAMMOGRAM, ENCOUNTER FOR: ICD-10-CM

## 2025-02-06 ENCOUNTER — APPOINTMENT (OUTPATIENT)
Facility: LOCATION | Age: 70
End: 2025-02-06
Attending: INTERNAL MEDICINE
Payer: MEDICARE

## 2025-02-25 ENCOUNTER — HOSPITAL ENCOUNTER (OUTPATIENT)
Dept: MAMMOGRAPHY | Facility: HOSPITAL | Age: 70
Discharge: HOME | End: 2025-02-25
Attending: NURSE PRACTITIONER
Payer: MEDICARE

## 2025-02-25 DIAGNOSIS — Z12.31 SCREENING MAMMOGRAM, ENCOUNTER FOR: ICD-10-CM

## 2025-02-25 PROCEDURE — 77063 BREAST TOMOSYNTHESIS BI: CPT | Performed by: NURSE PRACTITIONER

## 2025-02-25 PROCEDURE — 77067 SCR MAMMO BI INCL CAD: CPT | Performed by: NURSE PRACTITIONER

## 2025-03-17 ENCOUNTER — APPOINTMENT (OUTPATIENT)
Facility: LOCATION | Age: 70
End: 2025-03-17
Attending: INTERNAL MEDICINE
Payer: MEDICARE

## 2025-03-17 ENCOUNTER — HOSPITAL ENCOUNTER (OUTPATIENT)
Dept: MAMMOGRAPHY | Facility: HOSPITAL | Age: 70
Discharge: HOME | End: 2025-03-17
Attending: NURSE PRACTITIONER
Payer: MEDICARE

## 2025-03-17 DIAGNOSIS — R92.8 ABNORMAL MAMMOGRAM: ICD-10-CM

## 2025-03-17 PROCEDURE — 77065 DX MAMMO INCL CAD UNI: CPT | Performed by: NURSE PRACTITIONER

## 2025-03-17 PROCEDURE — 77061 BREAST TOMOSYNTHESIS UNI: CPT | Performed by: NURSE PRACTITIONER

## 2025-06-05 ENCOUNTER — APPOINTMENT (OUTPATIENT)
Facility: LOCATION | Age: 70
End: 2025-06-05
Attending: INTERNAL MEDICINE
Payer: MEDICARE

## 2025-07-08 ENCOUNTER — OFFICE VISIT (OUTPATIENT)
Age: 70
End: 2025-07-08
Attending: INTERNAL MEDICINE
Payer: MEDICARE

## 2025-07-08 VITALS
BODY MASS INDEX: 27.18 KG/M2 | DIASTOLIC BLOOD PRESSURE: 89 MMHG | HEIGHT: 63 IN | WEIGHT: 153.38 LBS | HEART RATE: 72 BPM | RESPIRATION RATE: 16 BRPM | OXYGEN SATURATION: 97 % | TEMPERATURE: 98 F | SYSTOLIC BLOOD PRESSURE: 154 MMHG

## 2025-07-08 DIAGNOSIS — Z08 ENCOUNTER FOR FOLLOW-UP SURVEILLANCE OF BREAST CANCER: ICD-10-CM

## 2025-07-08 DIAGNOSIS — Z12.31 SCREENING MAMMOGRAM, ENCOUNTER FOR: ICD-10-CM

## 2025-07-08 DIAGNOSIS — Z17.0 MALIGNANT NEOPLASM OF LOWER-INNER QUADRANT OF LEFT BREAST IN FEMALE, ESTROGEN RECEPTOR POSITIVE (HCC): Primary | ICD-10-CM

## 2025-07-08 DIAGNOSIS — Z85.3 ENCOUNTER FOR FOLLOW-UP SURVEILLANCE OF BREAST CANCER: ICD-10-CM

## 2025-07-08 DIAGNOSIS — M85.80 OSTEOPENIA AFTER MENOPAUSE: ICD-10-CM

## 2025-07-08 DIAGNOSIS — R92.8 ABNORMAL MAMMOGRAM: ICD-10-CM

## 2025-07-08 DIAGNOSIS — C50.312 MALIGNANT NEOPLASM OF LOWER-INNER QUADRANT OF LEFT BREAST IN FEMALE, ESTROGEN RECEPTOR POSITIVE (HCC): Primary | ICD-10-CM

## 2025-07-08 DIAGNOSIS — Z78.0 OSTEOPENIA AFTER MENOPAUSE: ICD-10-CM

## 2025-07-08 NOTE — PROGRESS NOTES
HPI     Romina Hsu is a 69 year old female here for follow up of   Encounter Diagnoses   Name Primary?    Malignant neoplasm of lower-inner quadrant of left breast in female, estrogen receptor positive (HCC) Yes    Osteopenia after menopause     Encounter for follow-up surveillance of breast cancer     Screening mammogram, encounter for     Abnormal mammogram        Compliance with SBE: Yes. Changes on SBE: No.     Completed anastrozole for 5 yrs in May of 2024.  Side effects improved to resolved.     No issues with lymphedema.    She reports fatigue.     She is very active - stretching, walking, tennis.    Working on weight loss.    Patient had bilateral screening mammogram on 2/25/2025 she was recalled for additional views of the left upper outer quadrant calcifications additional imaging done on 3/17/2025 of the left breast which showed probable benign calcifications of the left breast for which a left breast mammogram was recommended in 6 months.      ECOG PS: 0.        Review of Systems:   Review of Systems   Constitutional:  Negative for appetite change, fatigue and unexpected weight change.   Respiratory:  Negative for cough and shortness of breath.    Cardiovascular:  Negative for chest pain.   Gastrointestinal:  Negative for abdominal pain.   Endocrine: Positive for hot flashes (at night and not horrible and not as often).   Musculoskeletal:  Positive for arthralgias (L knee DJD.). Negative for back pain and neck pain.        No bone pain   Skin:         Increased superficial varicosities BLE   Neurological:  Negative for dizziness and headaches.   Hematological:  Negative for adenopathy.   Psychiatric/Behavioral:  Positive for sleep disturbance (Chronic).              Current Outpatient Medications   Medication Sig Dispense Refill    ergocalciferol 1.25 MG (57763 UT) Oral Cap Take 1 capsule (50,000 Units total) by mouth once a week. 4 capsule 2    hydrochlorothiazide 12.5 MG Oral Cap        anastrozole 1 MG Oral Tab tab Take 1 tablet (1 mg total) by mouth daily. 90 tablet 0     Allergies:   No Known Allergies    Past Medical History:    Breast cancer (HCC)    5/2019    Hypertension     Past Surgical History:   Procedure Laterality Date    Lumpectomy left Left     3/2020    Lumpectomy left Left     6/2020    Radiation left Left     8/2020     Social History     Socioeconomic History    Marital status:    Tobacco Use    Smoking status: Never    Smokeless tobacco: Never   Vaping Use    Vaping status: Never Used   Substance and Sexual Activity    Alcohol use: Yes    Drug use: Never     Social Drivers of Health     Food Insecurity: No Food Insecurity (11/26/2024)    Received from [facility]    Food Insecurity     Currently or in the past 3 months, have you worried your food would run out before you had money to buy more?: No     In the past 12 months, have you run out of food or been unable to get more?: No   Transportation Needs: No Transportation Needs (11/26/2024)    Received from [facility]    Transportation Needs     Currently or in the past 3 months, has lack of transportation kept you from medical appointments, getting food or medicine, or providing care to a family member?: No     Medical Transportation Needs?: No    Received from [facility]    Housing Stability       Family History   Problem Relation Age of Onset    Breast Cancer Self 64    Cancer Father         Colon Cancer    Cancer Sister 38        Colon ca    Cancer Paternal Grandfather         Colon Ca    Ovarian Cancer Neg     Pancreatic Cancer Neg          PHYSICAL EXAM:    /89 (BP Location: Left arm, Patient Position: Sitting, Cuff Size: adult)   Pulse 72   Temp 97.8 °F (36.6 °C) (Tympanic)   Resp 16   Ht 1.6 m (5' 3\")   Wt 69.6 kg (153 lb 6.4 oz)   SpO2 97%   BMI 27.17 kg/m²   Wt Readings from Last 6 Encounters:   07/08/25 69.6 kg (153 lb 6.4 oz)   02/02/24  70.8 kg (156 lb)   05/18/23 68.9 kg (152 lb)   08/26/22 71.7 kg (158 lb)   01/03/22 72.1 kg (159 lb)   05/21/21 68.9 kg (152 lb)     Physical Exam  General: Patient is alert, not in acute distress.  HEENT: EOMs intact.  Neck: No JVD. No palpable lymphadenopathy. Neck is supple.  Chest: Clear to auscultation.  Breasts: R breast no masses, L breast with surgical scar at the LIQ and at axilla, no masses.  Heart: Regular rate and rhythm.   Abdomen: Soft, non tender with good bowel sounds.  Extremities: No edema, increased superficial varicosities   Neurological: Grossly intact.   Lymphatics: There is no palpable lymphadenopathy throughout in the cervical, supraclavicular, or axillary regions.  Psych/Depression: nl        ASSESSMENT/PLAN:     1. Malignant neoplasm of lower-inner quadrant of left breast in female, estrogen receptor positive (HCC)    2. Osteopenia after menopause    3. Encounter for follow-up surveillance of breast cancer    4. Screening mammogram, encounter for    5. Abnormal mammogram         Cancer Staging   Malignant neoplasm of lower-inner quadrant of left breast in female, estrogen receptor positive (HCC)  Staging form: Breast, AJCC 8th Edition  - Pathologic stage from 5/21/2021: Stage IA (pT1c, pN0(sn), cM0, G2, ER+, TX+, HER2-, Oncotype DX score: 4) - Signed by Miranda Butcher MD on 5/21/2021    Patient with completed primary local treatment of left breast cancer with lumpectomy followed by radiation therapy.    Her Oncotype DX recurrence score was 4.  She has been on anastrozole since September 2020.  Prior to that due to insurance delay from time of dx to surgery, she was on tamoxifen since May of 2019, which was neoadjuvant until Sept of 2020.      On surveillance as per NCCN guidelines.  D/w patient f/u every 3-6 mo for 5 yrs, SBE monthly, Mammogram of post-lumpectomy every 6 mo until stable and then B mammogram yearly.  Further imaging and labs not recommended unless new symptoms.     The  patient completed 5 years of treatment on May of 2024, as she had neoadjuvant tamoxifen starting on May of 2019.      Patient had bilateral screening mammogram on 2/25/2025 she was recalled for additional views of the left upper outer quadrant calcifications additional imaging done on 3/17/2025 of the left breast which showed probable benign calcifications of the left breast for which a left breast mammogram was recommended in 6 months.    September 2021 DEXA = osteopenia.  Continue calcium and vitamin D.  Repeat DEXA in September 2023, patient is not completed, patient overdue and needs to schedule at her earliest convenience, new order given.      During prior visit, patient is advised to begin or continue progressive daily aerobic exercise program, follow a low fat, low cholesterol diet, attempt to loose weight, decrease or avoid alcohol intake, have regular screening exams as age/gender appropriate, reduce salt in the diet and cooking, reduce exposure to stress, improve dietary compliance and continue current medications.  Information for the [facility] and programs to support the above recommendations was provided to the patient.     Call prn.  Follow-up in 12 months .  Will notify patient of imaging studies results.     MDM low       Quality measures:    Hypertension target range 130-140/70-80  Per PCP.     Tobacco:  She has never smoked tobacco.            No orders of the defined types were placed in this encounter.      Results From Past 48 Hours:  No results found for this or any previous visit (from the past 48 hours).      Imaging & Referrals:  [facility] LEXII 2D+3D DIAGNOSTIC HAKEEM LEFT (CPT=77065/72242)  XR DEXA BONE DENSITOMETRY (CPT=77080)   No orders of the defined types were placed in this encounter.    PROCEDURE: [facility] LEXII 2D+3D SCREENING BILAT (72278/00180)     COMPARISON: [facility], [facility] LEXII 2D+3D DIAGNOSTIC HAKEEM LEFT (CPT=77065/79016), 9/17/2021, 2:05 PM.  [facility]  [facility], [facility] LEXII 2D+3D DIAGNOSTIC HAKEEM BILAT (IFE=73583/87029), 4/08/2022, 9:08 AM.  [facility], [facility] LEXII 2D+3D DIAGNOSTIC HAKEEM LEFT (CPT=77065/06211), 1/26/2023, 7:11 AM.  [facility], [facility] LEXII 2D+3D DIAGNOSTIC HAKEEM BILAT (TKX=80693/03753), 1/25/2024, 1:24 PM.     INDICATIONS: Z12.31 Screening mammogram, encounter for     TECHNIQUE: Full field direct screening mammography was performed and images were reviewed with the [employer] THEO 1.5.1.5 CAD device.  3D tomosynthesis was performed and reviewed        BREAST COMPOSITION:   Category b-Scattered areas fibroglandular density.        FINDINGS:  There are calcifications in the upper outer posterior left breast.     There are postsurgical changes in the left breast and axilla. There are benign-appearing calcifications bilaterally. There is no suspicious mass, architectural distortion, or microcalcifications identified in the right breast.                Impression  CONCLUSION:       Additional diagnostic mammographic views with magnification recommended for left upper outer calcifications.        BI-RADS CATEGORY:    DIAGNOSTIC CATEGORY 0 - INCOMPLETE ASSESSMENT: NEED ADDITIONAL IMAGING EVALUATION.       RECOMMENDATIONS:  ADDITIONAL MAMMOGRAPHIC VIEWS REQUIRED: LEFT BREAST  --We will call the patient back for additional views and issue an addendum report.       ULTRASOUND: LEFT BREAST  --We will call the patient back for an ultrasound and provide an additional report.                   PLEASE NOTE: NORMAL MAMMOGRAM DOES NOT EXCLUDE THE POSSIBILITY OF BREAST CANCER.  A CLINICALLY SUSPICIOUS PALPABLE LUMP SHOULD BE BIOPSIED.       For patients over the age of 40, the target due date for the patient's next mammogram has been entered into a reminder system.       Patient received a discharge summary from the technologist after completion of exam.     Breast marker legend used on images    Triangle = Palpable lump  [place]  = Skin tag or mole  BB = Nipple  Linear delia = Scar  Square = Pain           Dictated by (CST): America Nunn MD on 2/27/2025 at 9:13 AM      Finalized by (CST): America Nunn MD on 2/27/2025 at 9:19 AM          [facility]  [address] [place] [address]  [phone]    PROCEDURE: [facility] LEXII 2D+3D DIAGNOSTIC ADDL VWS  LEFT (CPT=77065/48820)     COMPARISON: [facility], [facility] LEXII 2D+3D SCREENING BILAT ([id number]/26892), 2/25/2025, 11:22 AM.     INDICATIONS: Ms. Hsu is a 69 year old female recalled from screening mammogram for calcifications of the left upper outer breast.     TECHNIQUE: Digital diagnostic mammography of the left breast was performed.  3D tomosynthesis was performed and reviewed.          BREAST COMPOSITION:   Category B - The breasts have scattered areas fibroglandular density.        LEFT UNILATERAL DIAGNOSTIC MAMMOGRAM: Spot magnification views of the left upper outer breast demonstrate a loose cluster predominantly coarse, heterogeneous calcifications, spanning approximately 0.8 cm.                  Impression   CONCLUSION:   Probably benign calcifications of the left upper outer breast.     BI-RADS CATEGORY:    DIAGNOSTIC CATEGORY 3 - PROBABLY BENIGN FINDING.       RECOMMENDATIONS:  SHORT TERM FOLLOW-UP DIAGNOSTIC MAMMOGRAM LEFT BREAST IN 6 MONTHS.               PLEASE NOTE: NORMAL MAMMOGRAM DOES NOT EXCLUDE THE POSSIBILITY OF BREAST CANCER.  A CLINICALLY SUSPICIOUS PALPABLE LUMP SHOULD BE BIOPSIED.       For patients over the age of 40, the target due date for the patient's next mammogram has been entered into a reminder system.       Patient received a discharge summary from the technologist after completion of exam.     Breast marker legend used on images    Triangle = Palpable lump  [place] = Skin tag or mole  BB = Nipple  Linear delia = Scar  Square = Pain           Dictated by (CST): Eloise Miller MD on 3/17/2025 at 11:40 AM       Finalized by (CST): Eloise Miller MD on 3/17/2025 at 11:43 AM          [address] [place], [place] [address]  [phone]